# Patient Record
Sex: FEMALE | Race: WHITE | NOT HISPANIC OR LATINO | Employment: FULL TIME | URBAN - METROPOLITAN AREA
[De-identification: names, ages, dates, MRNs, and addresses within clinical notes are randomized per-mention and may not be internally consistent; named-entity substitution may affect disease eponyms.]

---

## 2023-08-16 ENCOUNTER — HOSPITAL ENCOUNTER (EMERGENCY)
Facility: CLINIC | Age: 53
Discharge: HOME | End: 2023-08-16
Attending: STUDENT IN AN ORGANIZED HEALTH CARE EDUCATION/TRAINING PROGRAM | Admitting: STUDENT IN AN ORGANIZED HEALTH CARE EDUCATION/TRAINING PROGRAM
Payer: COMMERCIAL

## 2023-08-16 VITALS
RESPIRATION RATE: 16 BRPM | TEMPERATURE: 98.8 F | OXYGEN SATURATION: 97 % | HEIGHT: 66 IN | DIASTOLIC BLOOD PRESSURE: 71 MMHG | BODY MASS INDEX: 38.73 KG/M2 | HEART RATE: 95 BPM | WEIGHT: 241 LBS | SYSTOLIC BLOOD PRESSURE: 135 MMHG

## 2023-08-16 DIAGNOSIS — N39.0 COMPLICATED UTI (URINARY TRACT INFECTION): ICD-10-CM

## 2023-08-16 DIAGNOSIS — N12 PYELONEPHRITIS: Primary | ICD-10-CM

## 2023-08-16 LAB
ALBUMIN UR-MCNC: 30 MG/DL
ANION GAP SERPL CALCULATED.3IONS-SCNC: 13 MMOL/L (ref 7–15)
APPEARANCE UR: ABNORMAL
BACTERIA #/AREA URNS HPF: ABNORMAL /HPF
BASOPHILS # BLD AUTO: 0 10E3/UL (ref 0–0.2)
BASOPHILS NFR BLD AUTO: 0 %
BILIRUB UR QL STRIP: NEGATIVE
BUN SERPL-MCNC: 5.3 MG/DL (ref 6–20)
CALCIUM SERPL-MCNC: 9.1 MG/DL (ref 8.6–10)
CHLORIDE SERPL-SCNC: 101 MMOL/L (ref 98–107)
COLOR UR AUTO: ABNORMAL
CREAT SERPL-MCNC: 0.62 MG/DL (ref 0.51–0.95)
DEPRECATED HCO3 PLAS-SCNC: 22 MMOL/L (ref 22–29)
EOSINOPHIL # BLD AUTO: 0.1 10E3/UL (ref 0–0.7)
EOSINOPHIL NFR BLD AUTO: 1 %
ERYTHROCYTE [DISTWIDTH] IN BLOOD BY AUTOMATED COUNT: 12.6 % (ref 10–15)
GFR SERPL CREATININE-BSD FRML MDRD: >90 ML/MIN/1.73M2
GLUCOSE SERPL-MCNC: 107 MG/DL (ref 70–99)
GLUCOSE UR STRIP-MCNC: NEGATIVE MG/DL
HCO3 BLDV-SCNC: 26 MMOL/L (ref 21–28)
HCT VFR BLD AUTO: 43.7 % (ref 35–47)
HGB BLD-MCNC: 15.2 G/DL (ref 11.7–15.7)
HGB UR QL STRIP: ABNORMAL
HOLD SPECIMEN: NORMAL
HOLD SPECIMEN: NORMAL
IMM GRANULOCYTES # BLD: 0.1 10E3/UL
IMM GRANULOCYTES NFR BLD: 1 %
KETONES UR STRIP-MCNC: NEGATIVE MG/DL
LACTATE BLD-SCNC: 0.6 MMOL/L
LEUKOCYTE ESTERASE UR QL STRIP: ABNORMAL
LYMPHOCYTES # BLD AUTO: 1.3 10E3/UL (ref 0.8–5.3)
LYMPHOCYTES NFR BLD AUTO: 10 %
MCH RBC QN AUTO: 32.3 PG (ref 26.5–33)
MCHC RBC AUTO-ENTMCNC: 34.8 G/DL (ref 31.5–36.5)
MCV RBC AUTO: 93 FL (ref 78–100)
MONOCYTES # BLD AUTO: 0.7 10E3/UL (ref 0–1.3)
MONOCYTES NFR BLD AUTO: 5 %
NEUTROPHILS # BLD AUTO: 10.7 10E3/UL (ref 1.6–8.3)
NEUTROPHILS NFR BLD AUTO: 83 %
NITRATE UR QL: NEGATIVE
NRBC # BLD AUTO: 0 10E3/UL
NRBC BLD AUTO-RTO: 0 /100
PCO2 BLDV: 36 MM HG (ref 40–50)
PH BLDV: 7.47 [PH] (ref 7.32–7.43)
PH UR STRIP: 6.5 [PH] (ref 5–7)
PLATELET # BLD AUTO: 165 10E3/UL (ref 150–450)
PO2 BLDV: 52 MM HG (ref 25–47)
POTASSIUM SERPL-SCNC: 3.8 MMOL/L (ref 3.4–5.3)
RBC # BLD AUTO: 4.71 10E6/UL (ref 3.8–5.2)
RBC URINE: 3 /HPF
SAO2 % BLDV: 89 % (ref 94–100)
SODIUM SERPL-SCNC: 136 MMOL/L (ref 136–145)
SP GR UR STRIP: 1 (ref 1–1.03)
SQUAMOUS EPITHELIAL: 1 /HPF
UROBILINOGEN UR STRIP-MCNC: NORMAL MG/DL
WBC # BLD AUTO: 12.8 10E3/UL (ref 4–11)
WBC CLUMPS #/AREA URNS HPF: PRESENT /HPF
WBC URINE: >182 /HPF

## 2023-08-16 PROCEDURE — 36415 COLL VENOUS BLD VENIPUNCTURE: CPT | Performed by: EMERGENCY MEDICINE

## 2023-08-16 PROCEDURE — 87040 BLOOD CULTURE FOR BACTERIA: CPT | Mod: XS | Performed by: STUDENT IN AN ORGANIZED HEALTH CARE EDUCATION/TRAINING PROGRAM

## 2023-08-16 PROCEDURE — 82803 BLOOD GASES ANY COMBINATION: CPT

## 2023-08-16 PROCEDURE — 85025 COMPLETE CBC W/AUTO DIFF WBC: CPT | Performed by: EMERGENCY MEDICINE

## 2023-08-16 PROCEDURE — 81001 URINALYSIS AUTO W/SCOPE: CPT | Performed by: STUDENT IN AN ORGANIZED HEALTH CARE EDUCATION/TRAINING PROGRAM

## 2023-08-16 PROCEDURE — 258N000003 HC RX IP 258 OP 636: Performed by: STUDENT IN AN ORGANIZED HEALTH CARE EDUCATION/TRAINING PROGRAM

## 2023-08-16 PROCEDURE — 82310 ASSAY OF CALCIUM: CPT | Performed by: STUDENT IN AN ORGANIZED HEALTH CARE EDUCATION/TRAINING PROGRAM

## 2023-08-16 PROCEDURE — 96365 THER/PROPH/DIAG IV INF INIT: CPT

## 2023-08-16 PROCEDURE — 81001 URINALYSIS AUTO W/SCOPE: CPT | Performed by: EMERGENCY MEDICINE

## 2023-08-16 PROCEDURE — 83605 ASSAY OF LACTIC ACID: CPT

## 2023-08-16 PROCEDURE — 93005 ELECTROCARDIOGRAM TRACING: CPT

## 2023-08-16 PROCEDURE — 250N000011 HC RX IP 250 OP 636: Mod: JZ | Performed by: STUDENT IN AN ORGANIZED HEALTH CARE EDUCATION/TRAINING PROGRAM

## 2023-08-16 PROCEDURE — 36415 COLL VENOUS BLD VENIPUNCTURE: CPT | Performed by: STUDENT IN AN ORGANIZED HEALTH CARE EDUCATION/TRAINING PROGRAM

## 2023-08-16 PROCEDURE — 87186 SC STD MICRODIL/AGAR DIL: CPT | Performed by: STUDENT IN AN ORGANIZED HEALTH CARE EDUCATION/TRAINING PROGRAM

## 2023-08-16 PROCEDURE — 85025 COMPLETE CBC W/AUTO DIFF WBC: CPT | Performed by: STUDENT IN AN ORGANIZED HEALTH CARE EDUCATION/TRAINING PROGRAM

## 2023-08-16 PROCEDURE — 99284 EMERGENCY DEPT VISIT MOD MDM: CPT | Mod: 25

## 2023-08-16 RX ORDER — CEFPODOXIME PROXETIL 200 MG/1
200 TABLET, FILM COATED ORAL 2 TIMES DAILY
Qty: 20 TABLET | Refills: 0 | Status: SHIPPED | OUTPATIENT
Start: 2023-08-16 | End: 2023-08-26

## 2023-08-16 RX ORDER — CEFTRIAXONE 2 G/1
2 INJECTION, POWDER, FOR SOLUTION INTRAMUSCULAR; INTRAVENOUS ONCE
Status: COMPLETED | OUTPATIENT
Start: 2023-08-16 | End: 2023-08-16

## 2023-08-16 RX ADMIN — CEFTRIAXONE SODIUM 2 G: 2 INJECTION, POWDER, FOR SOLUTION INTRAMUSCULAR; INTRAVENOUS at 21:37

## 2023-08-16 RX ADMIN — SODIUM CHLORIDE 1000 ML: 9 INJECTION, SOLUTION INTRAVENOUS at 21:15

## 2023-08-16 ASSESSMENT — ACTIVITIES OF DAILY LIVING (ADL): ADLS_ACUITY_SCORE: 33

## 2023-08-17 NOTE — RESULT ENCOUNTER NOTE
[facility] Emergency Dept discharge antibiotic (if prescribed): Cefpodoxime (Vantin) 200 MG tablet, 1 tablet (200 mg) by mouth 2 times daily for 10 days   Date of Rx (if applicable):  8/16/23  No changes in treatment per [facility] ED Lab Result Urine culture protocol.

## 2023-08-17 NOTE — ED TRIAGE NOTES
Patient presents with Pain with urinary frequency, urgency, pain with urination, and bilateral flank pain.  UTI symptoms started 2 days ago.  Bilateral flank pain started today.  She reports temp was slightly elevated today at 99.6. She is tachycardic in triage.      Triage Assessment       Row Name 08/16/23 1951       Triage Assessment (Adult)    Airway WDL WDL       Respiratory WDL    Respiratory WDL WDL       Skin Circulation/Temperature WDL    Skin Circulation/Temperature WDL WDL       Cardiac WDL    Cardiac WDL X;rhythm     Pulse Rate & Regularity tachycardic        Peripheral/Neurovascular WDL    Peripheral Neurovascular WDL WDL       Cognitive/Neuro/Behavioral WDL    Cognitive/Neuro/Behavioral WDL WDL

## 2023-08-17 NOTE — ED PROVIDER NOTES
"History     Chief Complaint:  UTI     The history is provided by the patient.      Theresa Snider is a 53 year old female presenting with no past pertinent medical history who presents to the emergency department for UTI. The patient states that for a few days, she has been experiencing dysuria, bilateral flank pain, and a temp of 99.0.  She also complains of suprapubic pain. She has frequent urination but thinks that could be due to her recently consuming more fluids. Denies hematuria but notes her urine is cloudy in appearance. Denies any history of diabetes. Denies any history of kidney stones. Denies frequent UTIs and notes her most previous one was a year ago. She notes she has fatty liver disease.    Independent Historian:   None - Patient Only    Review of External Notes:   None.    Medications:    Hydrocodone-acetaminophen    Past Medical History:    No past pertinent medical history.    Past Surgical History:    Appendectomy  Cholecystectomy  4 C-sections  Sinus surgery    Physical Exam   Patient Vitals for the past 24 hrs:   BP Temp Temp src Pulse Resp SpO2 Height Weight   08/16/23 2102 (!) 143/77 -- -- 107 -- -- -- --   08/16/23 1958 (!) 148/71 98.8  F (37.1  C) Temporal (!) 124 18 96 % 1.676 m (5' 6\") 109.3 kg (241 lb)      Physical Exam  Vitals and nursing note reviewed.   Constitutional:       General: She is not in acute distress.     Appearance: Normal appearance. She is not ill-appearing.   Cardiovascular:      Rate and Rhythm: Regular rhythm. Tachycardia present.   Pulmonary:      Effort: Pulmonary effort is normal.   Abdominal:      Tenderness: There is abdominal tenderness (Suprapubic). There is left CVA tenderness. There is no right CVA tenderness.   Skin:     General: Skin is warm and dry.      Coloration: Skin is not pale.   Neurological:      Mental Status: She is alert and oriented to person, place, and time.         Emergency Department Course     Laboratory:  Labs Ordered and Resulted " from Time of ED Arrival to Time of ED Departure   ROUTINE UA WITH MICROSCOPIC REFLEX TO CULTURE - Abnormal       Result Value    Color Urine Light Brown (*)     Appearance Urine Slightly Cloudy (*)     Glucose Urine Negative      Bilirubin Urine Negative      Ketones Urine Negative      Specific Gravity Urine 1.003      Blood Urine Large (*)     pH Urine 6.5      Protein Albumin Urine 30 (*)     Urobilinogen Urine Normal      Nitrite Urine Negative      Leukocyte Esterase Urine Large (*)     Bacteria Urine Few (*)     WBC Clumps Urine Present (*)     RBC Urine 3 (*)     WBC Urine >182 (*)     Squamous Epithelials Urine 1     BASIC METABOLIC PANEL - Abnormal    Sodium 136      Potassium 3.8      Chloride 101      Carbon Dioxide (CO2) 22      Anion Gap 13      Urea Nitrogen 5.3 (*)     Creatinine 0.62      Calcium 9.1      Glucose 107 (*)     GFR Estimate >90     CBC WITH PLATELETS AND DIFFERENTIAL - Abnormal    WBC Count 12.8 (*)     RBC Count 4.71      Hemoglobin 15.2      Hematocrit 43.7      MCV 93      MCH 32.3      MCHC 34.8      RDW 12.6      Platelet Count 165      % Neutrophils 83      % Lymphocytes 10      % Monocytes 5      % Eosinophils 1      % Basophils 0      % Immature Granulocytes 1      NRBCs per 100 WBC 0      Absolute Neutrophils 10.7 (*)     Absolute Lymphocytes 1.3      Absolute Monocytes 0.7      Absolute Eosinophils 0.1      Absolute Basophils 0.0      Absolute Immature Granulocytes 0.1      Absolute NRBCs 0.0     ISTAT GASES LACTATE VENOUS POCT - Abnormal    Lactic Acid POCT 0.6      Bicarbonate Venous POCT 26      O2 Sat, Venous POCT 89 (*)     pCO2 Venous POCT 36 (*)     pH Venous POCT 7.47 (*)     pO2 Venous POCT 52 (*)    URINE CULTURE   BLOOD CULTURE   BLOOD CULTURE     Emergency Department Course & Assessments:    Interventions:  Medications   0.9% sodium chloride BOLUS (1,000 mLs Intravenous $New Bag 8/16/23 2115)   cefTRIAXone (ROCEPHIN) 2 g vial to attach to  ml bag for ADULTS  or NS 50 ml bag for PEDS (has no administration in time range)     Assessments:  2052 I obtained history and examined the patient as noted above.     Independent Interpretation (X-rays, CTs, rhythm strip):  None    Consultations/Discussion of Management or Tests:  None    Social Determinants of Health affecting care:   None    Disposition:  The patient was discharged to home.     Impression & Plan     Medical Decision Making:  Patient presenting with urinary symptoms and flank pain.  Considered differential including urinary tract infection, pyelonephritis, nephrolithiasis, sepsis.  Work-up notable for urinalysis concerning for infection, slight leukocytosis, lactate within normal limits, which is reassuring against severe sepsis.  Patient's heart rate did improve with IV fluids.  Due to concern for pyelonephritis, did administer ceftriaxone here.  Prior to this, did draw blood cultures.  We will plan to discharge with cephalosporin for 10 days.  Patient does have a penicillin allergy but notes she has had cephalosporins in the past.     Findings were discussed. Additional verbal instructions were provided.  I discussed specific warning signs and instructed the patient to return to the ED if there are any concerns.  Understanding of instructions was voiced, questions were answered and the patient was discharged.      Diagnosis:    ICD-10-CM    1. Pyelonephritis  N12       2. Complicated UTI (urinary tract infection)  N39.0            Discharge Medications:  New Prescriptions    CEFPODOXIME (VANTIN) 200 MG TABLET    Take 1 tablet (200 mg) by mouth 2 times daily for 10 days      8/16/2023   Shaun Christopher MD  08/16/23 4057

## 2023-08-17 NOTE — DISCHARGE INSTRUCTIONS
Thank you for allowing us to evaluate you today.    Follow up with your primary care provider in 1 week for reevaluation.     Take the antibiotic(s) as prescribed.   Please read the guidance provided with your discharge instructions.    Immediately return to the emergency department with any concerns.

## 2023-08-18 LAB — BACTERIA UR CULT: ABNORMAL

## 2023-08-21 LAB
BACTERIA BLD CULT: NO GROWTH
BACTERIA BLD CULT: NO GROWTH

## 2023-09-28 ENCOUNTER — HOSPITAL ENCOUNTER (EMERGENCY)
Facility: CLINIC | Age: 53
Discharge: HOME | End: 2023-09-28
Attending: EMERGENCY MEDICINE | Admitting: EMERGENCY MEDICINE
Payer: COMMERCIAL

## 2023-09-28 ENCOUNTER — APPOINTMENT (OUTPATIENT)
Dept: GENERAL RADIOLOGY | Facility: CLINIC | Age: 53
End: 2023-09-28
Attending: EMERGENCY MEDICINE
Payer: COMMERCIAL

## 2023-09-28 VITALS
TEMPERATURE: 98.3 F | RESPIRATION RATE: 18 BRPM | HEIGHT: 66 IN | BODY MASS INDEX: 37.93 KG/M2 | SYSTOLIC BLOOD PRESSURE: 129 MMHG | DIASTOLIC BLOOD PRESSURE: 80 MMHG | WEIGHT: 236 LBS | HEART RATE: 98 BPM | OXYGEN SATURATION: 98 %

## 2023-09-28 DIAGNOSIS — R00.0 TACHYCARDIA: ICD-10-CM

## 2023-09-28 DIAGNOSIS — R07.89 CHEST DISCOMFORT: ICD-10-CM

## 2023-09-28 LAB
ALBUMIN UR-MCNC: NEGATIVE MG/DL
ANION GAP SERPL CALCULATED.3IONS-SCNC: 14 MMOL/L (ref 7–15)
APPEARANCE UR: CLEAR
ATRIAL RATE - MUSE: 137 BPM
BASOPHILS # BLD AUTO: 0 10E3/UL (ref 0–0.2)
BASOPHILS NFR BLD AUTO: 0 %
BILIRUB UR QL STRIP: NEGATIVE
BUN SERPL-MCNC: 9.5 MG/DL (ref 6–20)
CALCIUM SERPL-MCNC: 9.9 MG/DL (ref 8.6–10)
CHLORIDE SERPL-SCNC: 102 MMOL/L (ref 98–107)
COLOR UR AUTO: NORMAL
CREAT SERPL-MCNC: 0.65 MG/DL (ref 0.51–0.95)
D DIMER PPP FEU-MCNC: <0.27 UG/ML FEU (ref 0–0.5)
DIASTOLIC BLOOD PRESSURE - MUSE: NORMAL MMHG
EGFRCR SERPLBLD CKD-EPI 2021: >90 ML/MIN/1.73M2
EOSINOPHIL # BLD AUTO: 0.2 10E3/UL (ref 0–0.7)
EOSINOPHIL NFR BLD AUTO: 3 %
ERYTHROCYTE [DISTWIDTH] IN BLOOD BY AUTOMATED COUNT: 13.1 % (ref 10–15)
GLUCOSE SERPL-MCNC: 109 MG/DL (ref 70–99)
GLUCOSE UR STRIP-MCNC: NEGATIVE MG/DL
HCO3 SERPL-SCNC: 24 MMOL/L (ref 22–29)
HCT VFR BLD AUTO: 47 % (ref 35–47)
HGB BLD-MCNC: 16.3 G/DL (ref 11.7–15.7)
HGB UR QL STRIP: NEGATIVE
IMM GRANULOCYTES # BLD: 0 10E3/UL
IMM GRANULOCYTES NFR BLD: 0 %
INTERPRETATION ECG - MUSE: NORMAL
KETONES UR STRIP-MCNC: NEGATIVE MG/DL
LEUKOCYTE ESTERASE UR QL STRIP: NEGATIVE
LYMPHOCYTES # BLD AUTO: 2.6 10E3/UL (ref 0.8–5.3)
LYMPHOCYTES NFR BLD AUTO: 44 %
MCH RBC QN AUTO: 32.5 PG (ref 26.5–33)
MCHC RBC AUTO-ENTMCNC: 34.7 G/DL (ref 31.5–36.5)
MCV RBC AUTO: 94 FL (ref 78–100)
MONOCYTES # BLD AUTO: 0.5 10E3/UL (ref 0–1.3)
MONOCYTES NFR BLD AUTO: 8 %
NEUTROPHILS # BLD AUTO: 2.7 10E3/UL (ref 1.6–8.3)
NEUTROPHILS NFR BLD AUTO: 45 %
NITRATE UR QL: NEGATIVE
NRBC # BLD AUTO: 0 10E3/UL
NRBC BLD AUTO-RTO: 0 /100
P AXIS - MUSE: 62 DEGREES
PH UR STRIP: 6 [PH] (ref 5–7)
PLATELET # BLD AUTO: 201 10E3/UL (ref 150–450)
POTASSIUM SERPL-SCNC: 4 MMOL/L (ref 3.4–5.3)
PR INTERVAL - MUSE: 142 MS
QRS DURATION - MUSE: 74 MS
QT - MUSE: 286 MS
QTC - MUSE: 431 MS
R AXIS - MUSE: 107 DEGREES
RBC # BLD AUTO: 5.02 10E6/UL (ref 3.8–5.2)
SODIUM SERPL-SCNC: 140 MMOL/L (ref 135–145)
SP GR UR STRIP: 1 (ref 1–1.03)
SYSTOLIC BLOOD PRESSURE - MUSE: NORMAL MMHG
T AXIS - MUSE: 40 DEGREES
TROPONIN T SERPL HS-MCNC: <6 NG/L
UROBILINOGEN UR STRIP-MCNC: NORMAL MG/DL
VENTRICULAR RATE- MUSE: 137 BPM
WBC # BLD AUTO: 5.9 10E3/UL (ref 4–11)

## 2023-09-28 PROCEDURE — 82310 ASSAY OF CALCIUM: CPT | Performed by: EMERGENCY MEDICINE

## 2023-09-28 PROCEDURE — 99285 EMERGENCY DEPT VISIT HI MDM: CPT | Mod: 25

## 2023-09-28 PROCEDURE — 85379 FIBRIN DEGRADATION QUANT: CPT | Performed by: EMERGENCY MEDICINE

## 2023-09-28 PROCEDURE — 36415 COLL VENOUS BLD VENIPUNCTURE: CPT | Performed by: EMERGENCY MEDICINE

## 2023-09-28 PROCEDURE — 84484 ASSAY OF TROPONIN QUANT: CPT | Performed by: EMERGENCY MEDICINE

## 2023-09-28 PROCEDURE — 258N000003 HC RX IP 258 OP 636: Performed by: EMERGENCY MEDICINE

## 2023-09-28 PROCEDURE — 81003 URINALYSIS AUTO W/O SCOPE: CPT | Performed by: EMERGENCY MEDICINE

## 2023-09-28 PROCEDURE — 96360 HYDRATION IV INFUSION INIT: CPT

## 2023-09-28 PROCEDURE — 71046 X-RAY EXAM CHEST 2 VIEWS: CPT

## 2023-09-28 PROCEDURE — 93005 ELECTROCARDIOGRAM TRACING: CPT

## 2023-09-28 PROCEDURE — 85025 COMPLETE CBC W/AUTO DIFF WBC: CPT | Performed by: EMERGENCY MEDICINE

## 2023-09-28 PROCEDURE — 96361 HYDRATE IV INFUSION ADD-ON: CPT

## 2023-09-28 RX ADMIN — SODIUM CHLORIDE, POTASSIUM CHLORIDE, SODIUM LACTATE AND CALCIUM CHLORIDE 1000 ML: 600; 310; 30; 20 INJECTION, SOLUTION INTRAVENOUS at 05:23

## 2023-09-28 ASSESSMENT — ACTIVITIES OF DAILY LIVING (ADL): ADLS_ACUITY_SCORE: 35

## 2023-09-28 NOTE — DISCHARGE INSTRUCTIONS
As we discussed the does not like her heart rate was actually quite elevated in the middle of the night, and here it actually was quite elevated as well initially, though it has come down with IV fluids and observation.  Your EKG does not show any malignant arrhythmia, but you absolutely do need to follow-up with your regular doctor in the next 1 week to see if you are at risk for any arrhythmias that may be causing your symptoms.  Please come back to the ER immediately with any concerns you have or any new symptoms.

## 2023-09-28 NOTE — ED PROVIDER NOTES
"  History     Chief Complaint:  Chest Pain       HPI   Theresa Snider is a 53 year old female who presents with an episode of sweats, fast heart rate and chest discomfort that occurred in the early hours of this morning.  She states that she felt fine going to bed, woke up in the middle the night after hearing a \"snap\" from her 's CPAP mask, and stated that she felt like she had palpitations and \"wave like\" chest pain.  States that it radiated to her shoulder and neck.  Measured her heart rate and found it to be in the 130s at that time, comes here now and states that she does not have any active chest pain.    At no point was exertional, does endorse night sweats that have been normal for her in the last year due to menopause she states, and that today was similar to those episodes the slightly more intense.  Denies any nausea or vomiting, denies any abdominal pain, denies any dysuria or fevers, but does state that she can \"feel my bladder\" more than usual.      Independent Historian:   Yes,  at bedside, confirms above history    Review of External Notes: Yes, I reviewed patient's note from August 16, 2023 where she was seen with one of my colleagues for pyelonephritis.      Allergies:  Dye [Contrast Dye]  Morphine Hcl  Penicillins  Shellfish Allergy  Sulfa Antibiotics     Medications:    HYDROcodone-acetaminophen (NORCO) 5-325 MG per tablet        Past Medical History:    No past medical history on file.    Past Surgical History:    Past Surgical History:   Procedure Laterality Date    APPENDECTOMY      ARTHROSCOPY KNEE WITH RETINACULAR RELEASE      left    CHOLECYSTECTOMY      COLONOSCOPY  12/4/2012    Procedure: COLONOSCOPY;  COLONOSCOPY ;  Surgeon: Clarke Sibley MD;  Location:  GI    GYN SURGERY      4 c-sections    SINUS SURGERY          Family History:    family history is not on file.    Social History:   reports that she quit smoking about 25 years ago. She does not have any " "smokeless tobacco history on file. She reports current alcohol use. She reports that she does not use drugs.  PCP: Pediatrics, [facility]     Physical Exam   Patient Vitals for the past 24 hrs:   BP Temp Temp src Pulse Resp SpO2 Height Weight   09/28/23 0523 137/71 -- -- 115 18 95 % -- --   09/28/23 0438 (!) 170/68 98.3  F (36.8  C) Oral (!) 142 19 100 % 1.676 m (5' 6\") 107 kg (236 lb)        Physical Exam  Vitals: reviewed by me  General: Pt seen on [facility], pleasant, cooperative, and alert to conversation  Eyes: Tracking well, clear conjunctiva BL  ENT: MMM, midline trachea.   Lungs: No tachypnea, no accessory muscle use. No respiratory distress.   CV: Rate as above  Abd: Soft, non tender, no guarding, no rebound. Non distended  MSK: no joint effusion.  No evidence of trauma  Skin: No rash  Neuro: Clear speech and no facial droop.  Psych: Not RIS, no e/o AH/VH          Emergency Department Course     ECG results from 09/28/23   EKG 12-lead, tracing only     Value    Systolic Blood Pressure     Diastolic Blood Pressure     Ventricular Rate 137    Atrial Rate 137    IN Interval 142    QRS Duration 74        QTc 431    P Axis 62    R AXIS 107    T Axis 40    Interpretation ECG      Sinus tachycardia  Rightward axis  Cannot rule out Anterior infarct , age undetermined  Abnormal ECG  No previous ECGs available  Confirmed by GENERATED REPORT, COMPUTER ([id number]),  Adriana Rooney ([id number]) on 9/28/2023 12:13:37 PM           Imaging:  XR Chest 2 Views   Preliminary Result   IMPRESSION:    No evidence of active cardiopulmonary disease.                         Report per radiology    Laboratory:  Labs Ordered and Resulted from Time of ED Arrival to Time of ED Departure   BASIC METABOLIC PANEL - Abnormal       Result Value    Sodium 140      Potassium 4.0      Chloride 102      Carbon Dioxide (CO2) 24      Anion Gap 14      Urea Nitrogen 9.5      Creatinine 0.65      GFR Estimate >90      Calcium 9.9      Glucose " 109 (*)    CBC WITH PLATELETS AND DIFFERENTIAL - Abnormal    WBC Count 5.9      RBC Count 5.02      Hemoglobin 16.3 (*)     Hematocrit 47.0      MCV 94      MCH 32.5      MCHC 34.7      RDW 13.1      Platelet Count 201      % Neutrophils 45      % Lymphocytes 44      % Monocytes 8      % Eosinophils 3      % Basophils 0      % Immature Granulocytes 0      NRBCs per 100 WBC 0      Absolute Neutrophils 2.7      Absolute Lymphocytes 2.6      Absolute Monocytes 0.5      Absolute Eosinophils 0.2      Absolute Basophils 0.0      Absolute Immature Granulocytes 0.0      Absolute NRBCs 0.0     TROPONIN T, HIGH SENSITIVITY - Normal    Troponin T, High Sensitivity <6     D DIMER QUANTITATIVE - Normal    D-Dimer Quantitative <0.27          Emergency Department Course & Assessments:             Interventions:  Medications   lactated ringers BOLUS 1,000 mL (1,000 mLs Intravenous $New Bag 9/28/23 0808)               Social Determinants of Health affecting care:   Stress/Adjustment Disorders      Disposition:  The patient was discharged to home.     Impression & Plan        Medical Decision Making:  This is a very pleasant 53-year-old female who presents the emergency room with appears to be palpitations and chest discomfort in the middle the night.  Thankfully her D-dimer and troponin are both undetectably low, essentially ruling out PE and ACS.  She has no exertional symptoms, and it does sound like her heart rate is come down significantly since she first arrived, without any interventions apart and IV fluids.  Her EKG shows sinus tach, no evidence of A-fib or SVT, though she certainly does need to have these explored in more detail in the outpatient setting.  She has no evidence of syncope here, tells me she feels improved and would like to go home, and I do think that this is the next best step in her management.   at the bedside okay with this plan as well, and we discussed how she needs to see her regular doctor the  next 1 week for possible stress test versus Holter monitor versus other and I do believe that the patient to come back to the ER if any gets worse, she is quite reliable appearing.    Diagnosis:    ICD-10-CM    1. Chest discomfort  R07.89       2. Tachycardia  R00.0            Discharge Medications:  New Prescriptions    No medications on file          9/28/2023   Piero Henderson*        Piero Henderson MD  09/28/23 4886

## 2023-09-28 NOTE — ED TRIAGE NOTES
"Woke up at 0330 with chest pain, palpitations, sweaty, \"heat\" to the neck, upper left back discomfort. Pain is 6/10. Denies any PMHx.      Triage Assessment       Row Name 09/28/23 0439       Triage Assessment (Adult)    Airway WDL WDL       Respiratory WDL    Respiratory WDL WDL       Skin Circulation/Temperature WDL    Skin Circulation/Temperature WDL WDL       Cardiac WDL    Cardiac WDL X;chest pain       Chest Pain Assessment    Chest Pain Location anterior chest, left;upper back, left     Character throbbing    Chest Pain Intervention 12-lead ECG obtained       Peripheral/Neurovascular WDL    Peripheral Neurovascular WDL WDL       Cognitive/Neuro/Behavioral WDL    Cognitive/Neuro/Behavioral WDL WDL                    "